# Patient Record
Sex: FEMALE | Race: WHITE | NOT HISPANIC OR LATINO | Employment: UNEMPLOYED | URBAN - METROPOLITAN AREA
[De-identification: names, ages, dates, MRNs, and addresses within clinical notes are randomized per-mention and may not be internally consistent; named-entity substitution may affect disease eponyms.]

---

## 2019-04-16 ENCOUNTER — HISTORICAL (OUTPATIENT)
Dept: RADIOLOGY | Facility: HOSPITAL | Age: 49
End: 2019-04-16

## 2020-05-27 ENCOUNTER — HISTORICAL (OUTPATIENT)
Dept: ADMINISTRATIVE | Facility: HOSPITAL | Age: 50
End: 2020-05-27

## 2021-08-25 ENCOUNTER — HISTORICAL (OUTPATIENT)
Dept: ADMINISTRATIVE | Facility: HOSPITAL | Age: 51
End: 2021-08-25

## 2021-10-26 ENCOUNTER — HISTORICAL (OUTPATIENT)
Dept: RADIOLOGY | Facility: HOSPITAL | Age: 51
End: 2021-10-26

## 2021-10-26 LAB
ABS NEUT (OLG): 2.75
ALBUMIN SERPL-MCNC: 3.6 GM/DL (ref 3.5–5)
ALBUMIN/GLOB SERPL: 1 RATIO (ref 1.1–2)
ALP SERPL-CCNC: 68 UNIT/L (ref 40–150)
ALT SERPL-CCNC: 13 UNIT/L (ref 0–55)
AST SERPL-CCNC: 14 UNIT/L (ref 5–34)
BASOPHILS # BLD AUTO: 0.03 X10(3)/MCL
BASOPHILS NFR BLD AUTO: 0.5 %
BILIRUB SERPL-MCNC: 0.5 MG/DL
BILIRUBIN DIRECT+TOT PNL SERPL-MCNC: 0.2 MG/DL (ref 0–0.5)
BILIRUBIN DIRECT+TOT PNL SERPL-MCNC: 0.3 MG/DL
BUN SERPL-MCNC: 11 MG/DL (ref 9.8–20.1)
CALCIUM SERPL-MCNC: 9.7 MG/DL (ref 8.7–10.5)
CHLORIDE SERPL-SCNC: 101 MMOL/L (ref 98–107)
CHOLEST SERPL-MCNC: 225 MG/DL
CHOLEST/HDLC SERPL: 6 {RATIO} (ref 0–5)
CO2 SERPL-SCNC: 30 MEQ/L (ref 22–29)
CREAT SERPL-MCNC: 0.69 MG/DL (ref 0.55–1.02)
DEPRECATED CALCIDIOL+CALCIFEROL SERPL-MC: 38.9 NG/ML (ref 30–80)
EOSINOPHIL # BLD AUTO: 0.38 X10(3)/MCL
EOSINOPHIL NFR BLD AUTO: 6.6 %
ERYTHROCYTE [DISTWIDTH] IN BLOOD BY AUTOMATED COUNT: 15 %
EST. AVERAGE GLUCOSE BLD GHB EST-MCNC: 103 MG/DL
GLOBULIN SER-MCNC: 3.7 GM/DL (ref 2.4–3.5)
GLUCOSE SERPL-MCNC: 97 MG/DL (ref 74–100)
HBA1C MFR BLD: 5.2 % (ref 4–6)
HCT VFR BLD AUTO: 38.3 % (ref 34–46)
HDLC SERPL-MCNC: 35 MG/DL (ref 35–60)
HGB BLD-MCNC: 12.1 GM/DL (ref 11.3–15.4)
IMM GRANULOCYTES # BLD AUTO: 0.01 10*3/UL (ref 0–0.1)
IMM GRANULOCYTES NFR BLD AUTO: 0.2 % (ref 0–1)
LDLC SERPL CALC-MCNC: 150 MG/DL (ref 50–140)
LYMPHOCYTES # BLD AUTO: 2.22 X10(3)/MCL
LYMPHOCYTES NFR BLD AUTO: 38.5 %
MCH RBC QN AUTO: 26.5 PG (ref 27–33)
MCHC RBC AUTO-ENTMCNC: 31.6 GM/DL (ref 32–35)
MCV RBC AUTO: 84 FL (ref 81–97)
MONOCYTES # BLD AUTO: 0.37 X10(3)/MCL
MONOCYTES NFR BLD AUTO: 6.4 %
NEUTROPHILS # BLD AUTO: 2.75 X10(3)/MCL
NEUTROPHILS NFR BLD AUTO: 47.8 %
PLATELET # BLD AUTO: 331 X10(3)/MCL (ref 140–450)
PMV BLD AUTO: 9 FL
POTASSIUM SERPL-SCNC: 4.2 MMOL/L (ref 3.5–5.1)
PROT SERPL-MCNC: 7.3 GM/DL (ref 6.4–8.3)
RBC # BLD AUTO: 4.56 X10(6)/MCL (ref 3.9–5)
SODIUM SERPL-SCNC: 139 MMOL/L (ref 136–145)
TRIGL SERPL-MCNC: 201 MG/DL (ref 37–140)
TSH SERPL-ACNC: 2.76 UIU/ML (ref 0.35–4.94)
VLDLC SERPL CALC-MCNC: 40 MG/DL
WBC # SPEC AUTO: 5.76 X10(3)/MCL (ref 3.4–9.2)

## 2022-04-08 ENCOUNTER — HISTORICAL (OUTPATIENT)
Dept: ADMINISTRATIVE | Facility: HOSPITAL | Age: 52
End: 2022-04-08

## 2022-08-11 ENCOUNTER — HOSPITAL ENCOUNTER (EMERGENCY)
Facility: HOSPITAL | Age: 52
Discharge: HOME | End: 2022-08-11
Attending: GENERAL PRACTICE
Payer: MEDICAID

## 2022-08-11 VITALS
BODY MASS INDEX: 53.92 KG/M2 | SYSTOLIC BLOOD PRESSURE: 132 MMHG | HEART RATE: 69 BPM | DIASTOLIC BLOOD PRESSURE: 88 MMHG | TEMPERATURE: 99 F | RESPIRATION RATE: 22 BRPM | HEIGHT: 62 IN | WEIGHT: 293 LBS | OXYGEN SATURATION: 100 %

## 2022-08-11 DIAGNOSIS — J20.9 ACUTE BRONCHITIS, UNSPECIFIED ORGANISM: Primary | ICD-10-CM

## 2022-08-11 LAB
FLUAV AG UPPER RESP QL IA.RAPID: NOT DETECTED
FLUBV AG UPPER RESP QL IA.RAPID: NOT DETECTED
SARS-COV-2 RNA RESP QL NAA+PROBE: NOT DETECTED
STREP A PCR (OHS): NOT DETECTED

## 2022-08-11 PROCEDURE — 87631 RESP VIRUS 3-5 TARGETS: CPT | Performed by: GENERAL PRACTICE

## 2022-08-11 PROCEDURE — 99900031 HC PATIENT EDUCATION (STAT)

## 2022-08-11 PROCEDURE — 99284 EMERGENCY DEPT VISIT MOD MDM: CPT | Mod: 25

## 2022-08-11 PROCEDURE — 99900035 HC TECH TIME PER 15 MIN (STAT)

## 2022-08-11 PROCEDURE — 99285 EMERGENCY DEPT VISIT HI MDM: CPT

## 2022-08-11 PROCEDURE — 87636 SARSCOV2 & INF A&B AMP PRB: CPT | Mod: 59 | Performed by: GENERAL PRACTICE

## 2022-08-11 PROCEDURE — 25000242 PHARM REV CODE 250 ALT 637 W/ HCPCS: Performed by: GENERAL PRACTICE

## 2022-08-11 PROCEDURE — 94640 AIRWAY INHALATION TREATMENT: CPT

## 2022-08-11 PROCEDURE — 94761 N-INVAS EAR/PLS OXIMETRY MLT: CPT

## 2022-08-11 RX ORDER — IPRATROPIUM BROMIDE AND ALBUTEROL SULFATE 2.5; .5 MG/3ML; MG/3ML
3 SOLUTION RESPIRATORY (INHALATION)
Status: COMPLETED | OUTPATIENT
Start: 2022-08-11 | End: 2022-08-11

## 2022-08-11 RX ORDER — IPRATROPIUM BROMIDE AND ALBUTEROL SULFATE 2.5; .5 MG/3ML; MG/3ML
3 SOLUTION RESPIRATORY (INHALATION)
Status: DISPENSED | OUTPATIENT
Start: 2022-08-11 | End: 2022-08-11

## 2022-08-11 RX ORDER — ALBUTEROL SULFATE 90 UG/1
2 AEROSOL, METERED RESPIRATORY (INHALATION) EVERY 6 HOURS PRN
Qty: 18 G | Refills: 0 | Status: SHIPPED | OUTPATIENT
Start: 2022-08-11 | End: 2023-08-11

## 2022-08-11 RX ORDER — KETOROLAC TROMETHAMINE 30 MG/ML
60 INJECTION, SOLUTION INTRAMUSCULAR; INTRAVENOUS
Status: DISCONTINUED | OUTPATIENT
Start: 2022-08-11 | End: 2022-08-12 | Stop reason: HOSPADM

## 2022-08-11 RX ORDER — DEXAMETHASONE SODIUM PHOSPHATE 4 MG/ML
4 INJECTION, SOLUTION INTRA-ARTICULAR; INTRALESIONAL; INTRAMUSCULAR; INTRAVENOUS; SOFT TISSUE
Status: DISCONTINUED | OUTPATIENT
Start: 2022-08-11 | End: 2022-08-12 | Stop reason: HOSPADM

## 2022-08-11 RX ADMIN — IPRATROPIUM BROMIDE AND ALBUTEROL SULFATE 3 ML: 2.5; .5 SOLUTION RESPIRATORY (INHALATION) at 09:08

## 2022-08-12 NOTE — ED PROVIDER NOTES
Encounter Date: 8/11/2022       History     Chief Complaint   Patient presents with    Cough     Cough for 2 days, boil on right buttocks today     Cough for 2 days, boil on right buttocks today      Cough  This is a new problem. The current episode started several days ago. The problem occurs constantly. The problem has been unchanged. The cough is non-productive. There has been no fever. Associated symptoms include rhinorrhea, sore throat, shortness of breath and wheezing. She is not a smoker.     Review of patient's allergies indicates:  Not on File  No past medical history on file.  No past surgical history on file.  No family history on file.     Review of Systems   Constitutional: Negative.    HENT: Positive for rhinorrhea and sore throat.    Eyes: Negative.    Respiratory: Positive for cough, shortness of breath and wheezing.    Cardiovascular: Negative.    Gastrointestinal: Negative.    Endocrine: Negative.    Genitourinary: Negative.    Musculoskeletal: Negative.    Skin: Negative.    Allergic/Immunologic: Negative.    Neurological: Negative.    Hematological: Negative.    Psychiatric/Behavioral: Negative.    All other systems reviewed and are negative.      Physical Exam     Initial Vitals [08/11/22 1957]   BP Pulse Resp Temp SpO2   (!) 150/112 83 (!) 22 98.6 °F (37 °C) 100 %      MAP       --         Physical Exam    Nursing note and vitals reviewed.  Constitutional: She appears well-developed and well-nourished.   HENT:   Head: Normocephalic and atraumatic.   Eyes: EOM are normal. Pupils are equal, round, and reactive to light.   Neck: Neck supple.   Normal range of motion.  Cardiovascular: Normal rate, regular rhythm, normal heart sounds and intact distal pulses.   Pulmonary/Chest: No respiratory distress. She has wheezes. She has no rhonchi. She has no rales. She exhibits no tenderness.   Abdominal: Abdomen is soft. Bowel sounds are normal.   Musculoskeletal:         General: Normal range of motion.       Cervical back: Normal range of motion and neck supple.     Neurological: She is alert and oriented to person, place, and time. She has normal strength. GCS score is 15. GCS eye subscore is 4. GCS verbal subscore is 5. GCS motor subscore is 6.   Skin: Skin is warm and dry.   Psychiatric: She has a normal mood and affect. Her behavior is normal. Judgment and thought content normal.         ED Course   Procedures  Labs Reviewed   STREP GROUP A BY PCR - Normal   COVID/FLU A&B PCR - Normal          Imaging Results    None          Medications   albuterol-ipratropium 2.5 mg-0.5 mg/3 mL nebulizer solution 3 mL ( Nebulization Canceled Entry 8/11/22 2110)   albuterol-ipratropium 2.5 mg-0.5 mg/3 mL nebulizer solution 3 mL (has no administration in time range)   dexamethasone injection 4 mg (has no administration in time range)     Medical Decision Making:   Clinical Tests:   Lab Tests: Ordered and Reviewed  Patient is better but still has some expiratory wheezing.  She has been exposed to cigarette smoke at work.  This is likely the cause of her bronchitis.  Her lungs are otherwise clear.                      Clinical Impression:   Final diagnoses:  [J20.9] Acute bronchitis, unspecified organism (Primary)          ED Disposition Condition    Discharge Stable        ED Prescriptions     Medication Sig Dispense Start Date End Date Auth. Provider    albuterol (VENTOLIN HFA) 90 mcg/actuation inhaler Inhale 2 puffs into the lungs every 6 (six) hours as needed for Wheezing. Rescue 18 g 8/11/2022 8/11/2023 Rl Mejia MD        Follow-up Information     Follow up With Specialties Details Why Contact Info    Claude H. Meeks, MD Family Medicine In 5 days If symptoms worsen [address] [place] [address] DR Yisel [address]ERAS [id number]  [phone]             Rl Mejia MD  08/11/22 2670

## 2023-06-13 ENCOUNTER — HOSPITAL ENCOUNTER (EMERGENCY)
Facility: HOSPITAL | Age: 53
Discharge: HOME | End: 2023-06-13
Attending: FAMILY MEDICINE
Payer: MEDICAID

## 2023-06-13 VITALS
TEMPERATURE: 97 F | WEIGHT: 293 LBS | OXYGEN SATURATION: 99 % | DIASTOLIC BLOOD PRESSURE: 94 MMHG | RESPIRATION RATE: 18 BRPM | HEIGHT: 62 IN | SYSTOLIC BLOOD PRESSURE: 150 MMHG | HEART RATE: 70 BPM | BODY MASS INDEX: 53.92 KG/M2

## 2023-06-13 DIAGNOSIS — R60.9 SWELLING: Primary | ICD-10-CM

## 2023-06-13 DIAGNOSIS — Z91.038 ALLERGIC REACTION TO INSECT BITE: ICD-10-CM

## 2023-06-13 PROCEDURE — 63600175 PHARM REV CODE 636 W HCPCS: Performed by: FAMILY MEDICINE

## 2023-06-13 PROCEDURE — 96372 THER/PROPH/DIAG INJ SC/IM: CPT | Performed by: FAMILY MEDICINE

## 2023-06-13 PROCEDURE — 99284 EMERGENCY DEPT VISIT MOD MDM: CPT

## 2023-06-13 RX ORDER — CETIRIZINE HYDROCHLORIDE 10 MG/1
10 TABLET ORAL
COMMUNITY
Start: 2023-03-17

## 2023-06-13 RX ORDER — RIVAROXABAN 10 MG/1
10 TABLET, FILM COATED ORAL
COMMUNITY
Start: 2023-05-16

## 2023-06-13 RX ORDER — METHYLPREDNISOLONE SOD SUCC 125 MG
125 VIAL (EA) INJECTION
Status: COMPLETED | OUTPATIENT
Start: 2023-06-13 | End: 2023-06-13

## 2023-06-13 RX ORDER — AMLODIPINE BESYLATE 10 MG/1
10 TABLET ORAL
COMMUNITY
Start: 2023-05-16

## 2023-06-13 RX ORDER — GABAPENTIN 300 MG/1
300 CAPSULE ORAL 2 TIMES DAILY
COMMUNITY
Start: 2023-01-05

## 2023-06-13 RX ORDER — TRAZODONE HYDROCHLORIDE 100 MG/1
100 TABLET ORAL NIGHTLY
COMMUNITY
Start: 2023-06-07

## 2023-06-13 RX ORDER — GEMFIBROZIL 600 MG/1
600 TABLET, FILM COATED ORAL 2 TIMES DAILY
COMMUNITY
Start: 2023-05-16

## 2023-06-13 RX ORDER — BENZONATATE 200 MG/1
200 CAPSULE ORAL 2 TIMES DAILY PRN
COMMUNITY
Start: 2023-03-17

## 2023-06-13 RX ORDER — BUMETANIDE 1 MG/1
1 TABLET ORAL DAILY PRN
COMMUNITY
Start: 2023-05-16

## 2023-06-13 RX ORDER — METHYLPREDNISOLONE 4 MG/1
TABLET ORAL
Qty: 1 EACH | Refills: 0 | Status: SHIPPED | OUTPATIENT
Start: 2023-06-13 | End: 2023-07-04

## 2023-06-13 RX ORDER — ATORVASTATIN CALCIUM 20 MG/1
20 TABLET, FILM COATED ORAL
COMMUNITY
Start: 2023-06-03

## 2023-06-13 RX ORDER — MECLIZINE HCL 25MG 25 MG/1
25 TABLET, CHEWABLE ORAL
COMMUNITY

## 2023-06-13 RX ADMIN — METHYLPREDNISOLONE SODIUM SUCCINATE 125 MG: 125 INJECTION, POWDER, FOR SOLUTION INTRAMUSCULAR; INTRAVENOUS at 01:06

## 2023-06-13 NOTE — ED NOTES
Pt ambulated to room 4 with steady gait. Pt states she woke up with a swollen neck and has three insect bites to the upper back. Pt rates pain 7/10. Pt denies needs at this time. Family at the bedside.

## 2023-06-13 NOTE — ED PROVIDER NOTES
Encounter Date: 6/13/2023       History     Chief Complaint   Patient presents with    Neck Pain     Bilateral neck swelling and pain, onset this morning. Also has insect bites to upper back.      This patient is a 52-year-old female who comes in with bilateral neck swelling and pain that started this morning.  Patient has several insect bites on her upper back.  Patient states that the insect bites are very itchy as well as the swelling    The history is provided by the patient.   Review of patient's allergies indicates:   Allergen Reactions    Contrast media Nausea And Vomiting and Shortness Of Breath    Chlorpheniram-dm-acetaminophen Hives    Clonidine Hives    Dayquil sinus pressure/pain     Doxylamin-pse-dm-acetaminophen     Dye     Pseudoephed-dm-acetaminophen Hives    Tramadol Hives    Penicillins Nausea Only     No past medical history on file.  No past surgical history on file.  No family history on file.     Review of Systems   Constitutional: Negative.    HENT: Negative.     Respiratory: Negative.     Cardiovascular: Negative.    Endocrine: Negative.    Neurological: Negative.    Psychiatric/Behavioral: Negative.     All other systems reviewed and are negative.    Physical Exam     Initial Vitals [06/13/23 1247]   BP Pulse Resp Temp SpO2   (!) 161/97 64 20 97.2 °F (36.2 °C) 98 %      MAP       --         Physical Exam    Nursing note and vitals reviewed.  Constitutional: She appears well-developed.   HENT:   Head: Normocephalic.   Eyes: Pupils are equal, round, and reactive to light.   Neck:   There is no lymphadenopathy in the neck.  I do not appreciate the neck swelling but the patient says she feels like her neck is swollen.  She denies any throat swelling or difficulty swallowing or shortness of breath.   Cardiovascular:  Normal rate, regular rhythm and normal heart sounds.           Pulmonary/Chest: Breath sounds normal.   Abdominal: Abdomen is soft.   Musculoskeletal:      Cervical back: Decreased  range of motion.     Neurological: She is alert and oriented to person, place, and time. GCS score is 15. GCS eye subscore is 4. GCS verbal subscore is 5. GCS motor subscore is 6.   Skin: Skin is warm and dry. Capillary refill takes less than 2 seconds.        4-5 insect bites on the right upper back.   Psychiatric: She has a normal mood and affect.       ED Course   Procedures  Labs Reviewed - No data to display       Imaging Results    None          Medications   methylPREDNISolone sodium succinate injection 125 mg (125 mg Intramuscular Given 6/13/23 1314)     Medical Decision Making:   Initial Assessment:   This patient is a 52-year-old female with multiple insect bites on her right upper back.  Patient states that she woke up with bilateral neck swelling.  She also states that her arms are slightly swollen too.  This is not appreciated on exam  Differential Diagnosis:   Allergic reaction to insect bite, lymphadenopathy  ED Management:  On exam there are no swollen lymph nodes.  Patient states she appreciates some slight swelling in bilateral neck and in her bilateral upper arms.  She is given a steroid shot in the emergency room because she states that there is a lot of pruritus as well.                        Clinical Impression:   Final diagnoses:  [R60.9] Swelling (Primary)  [Z91.038] Allergic reaction to insect bite        ED Disposition Condition    Discharge Stable          ED Prescriptions       Medication Sig Dispense Start Date End Date Auth. Provider    methylPREDNISolone (MEDROL DOSEPACK) 4 mg tablet Take as directed 1 each 6/13/2023 7/4/2023 Vincent Jimenez MD          Follow-up Information       Follow up With Specialties Details Why Contact Info    Claude H. Meeks, MD Family Medicine Schedule an appointment as soon as possible for a visit in 2 days  [address] [place] [address] [address] Yisel [address]ERAS [id number]  [phone]               Vincent Jimenez MD  06/13/23 4836

## 2023-11-05 ENCOUNTER — HOSPITAL ENCOUNTER (EMERGENCY)
Facility: HOSPITAL | Age: 53
Discharge: HOME | End: 2023-11-05
Attending: GENERAL PRACTICE
Payer: MEDICAID

## 2023-11-05 VITALS
RESPIRATION RATE: 20 BRPM | TEMPERATURE: 99 F | WEIGHT: 293 LBS | OXYGEN SATURATION: 99 % | HEIGHT: 62 IN | HEART RATE: 72 BPM | SYSTOLIC BLOOD PRESSURE: 121 MMHG | BODY MASS INDEX: 53.92 KG/M2 | DIASTOLIC BLOOD PRESSURE: 67 MMHG

## 2023-11-05 DIAGNOSIS — L73.9 FOLLICULITIS: Primary | ICD-10-CM

## 2023-11-05 PROCEDURE — 99284 EMERGENCY DEPT VISIT MOD MDM: CPT

## 2023-11-05 PROCEDURE — 25000003 PHARM REV CODE 250: Performed by: GENERAL PRACTICE

## 2023-11-05 RX ORDER — SULFAMETHOXAZOLE AND TRIMETHOPRIM 800; 160 MG/1; MG/1
1 TABLET ORAL
Status: COMPLETED | OUTPATIENT
Start: 2023-11-05 | End: 2023-11-05

## 2023-11-05 RX ORDER — NAPROXEN 375 MG/1
375 TABLET ORAL 2 TIMES DAILY WITH MEALS
Qty: 60 TABLET | Refills: 0 | Status: SHIPPED | OUTPATIENT
Start: 2023-11-05 | End: 2023-12-30 | Stop reason: CLARIF

## 2023-11-05 RX ORDER — KETOROLAC TROMETHAMINE 10 MG/1
10 TABLET, FILM COATED ORAL
Status: COMPLETED | OUTPATIENT
Start: 2023-11-05 | End: 2023-11-05

## 2023-11-05 RX ORDER — SULFAMETHOXAZOLE AND TRIMETHOPRIM 800; 160 MG/1; MG/1
1 TABLET ORAL 2 TIMES DAILY
Qty: 14 TABLET | Refills: 0 | Status: SHIPPED | OUTPATIENT
Start: 2023-11-05 | End: 2023-11-12

## 2023-11-05 RX ADMIN — SULFAMETHOXAZOLE AND TRIMETHOPRIM 1 TABLET: 800; 160 TABLET ORAL at 01:11

## 2023-11-05 RX ADMIN — KETOROLAC TROMETHAMINE 10 MG: 10 TABLET, FILM COATED ORAL at 01:11

## 2023-11-05 NOTE — ED PROVIDER NOTES
Encounter Date: 11/5/2023       History     Chief Complaint   Patient presents with    Abscess     Right upper inner arm abscess x 2 days.       Right upper inner arm abscess x 2 days.      The history is provided by the patient.   Abscess   This is a new problem. The current episode started two days ago. The problem has been gradually worsening. The abscess is present on the right arm. The pain is at a severity of 5/10. The abscess is characterized by itchiness, redness and painfulness. She has received no recent medical care.     Review of patient's allergies indicates:   Allergen Reactions    Contrast media Nausea And Vomiting and Shortness Of Breath    Chlorpheniram-dm-acetaminophen Hives    Clonidine Hives    Dayquil sinus pressure/pain     Doxylamin-pse-dm-acetaminophen     Dye     Pseudoephed-dm-acetaminophen Hives    Tramadol Hives    Penicillins Nausea Only     No past medical history on file.  No past surgical history on file.  No family history on file.     Review of Systems   Constitutional: Negative.    HENT: Negative.     Eyes: Negative.    Respiratory: Negative.     Cardiovascular: Negative.    Gastrointestinal: Negative.    Endocrine: Negative.    Genitourinary: Negative.    Musculoskeletal: Negative.    Skin:  Positive for wound.   Allergic/Immunologic: Negative.    Neurological: Negative.    Hematological: Negative.    Psychiatric/Behavioral: Negative.     All other systems reviewed and are negative.      Physical Exam     Initial Vitals [11/05/23 1304]   BP Pulse Resp Temp SpO2   117/80 98 20 98.7 °F (37.1 °C) 99 %      MAP       --         Physical Exam    Nursing note and vitals reviewed.  Constitutional: She appears well-developed and well-nourished.   HENT:   Head: Normocephalic and atraumatic.   Eyes: EOM are normal. Pupils are equal, round, and reactive to light.   Neck: Neck supple.   Normal range of motion.  Cardiovascular:  Normal rate, regular rhythm, normal heart sounds and intact  distal pulses.           Pulmonary/Chest: Breath sounds normal.   Abdominal: Abdomen is soft. Bowel sounds are normal.   Musculoskeletal:         General: Normal range of motion.      Cervical back: Normal range of motion and neck supple.     Neurological: She is alert and oriented to person, place, and time. She has normal strength. GCS score is 15. GCS eye subscore is 4. GCS verbal subscore is 5. GCS motor subscore is 6.   Skin: Skin is warm and dry.        Punctate appearing area of folliculitis with minimal erythema.  There is no induration and no evidence of any acute cellulitis.     Psychiatric: She has a normal mood and affect. Her behavior is normal. Judgment and thought content normal.         ED Course   Procedures  Labs Reviewed - No data to display       Imaging Results    None          Medications   ketorolac tablet 10 mg (10 mg Oral Given 11/5/23 1330)   sulfamethoxazole-trimethoprim 800-160mg per tablet 1 tablet (1 tablet Oral Given 11/5/23 1330)     Medical Decision Making  If anything, this is a mild folliculitis.  However, it is causing the patient some distress so I have advised the patient to treat this with warm compresses.  Additionally, we will be giving the patient a prescription for some antibiotics as she has a history of atopic in the past that often gets worse without treatment.  We will also wrap the area so that it is not rubbing against her chest wall and causing greater pain.  There is no indication for any aggressive management and there is absolutely no indication for any sort of incision and drainage at this time as there is nothing to drain.                                 Clinical Impression:   Final diagnoses:  [L73.9] Folliculitis (Primary)        ED Disposition Condition    Discharge Stable          ED Prescriptions       Medication Sig Dispense Start Date End Date Auth. Provider    naproxen (NAPROSYN) 375 MG tablet Take 1 tablet (375 mg total) by mouth 2 (two) times daily  with meals. 60 tablet 11/5/2023 -- Rl Mejia MD    sulfamethoxazole-trimethoprim 800-160mg (BACTRIM DS) 800-160 mg Tab Take 1 tablet by mouth 2 (two) times daily. for 7 days 14 tablet 11/5/2023 11/12/2023 Rl Mejia MD          Follow-up Information       Follow up With Specialties Details Why Contact Info    Meeks, Claude H., MD Family Medicine In 2 days For wound re-check [address] [place] [address] DR Yisel [address]ERAS [id number]  [phone]               Rl Mejia MD  11/05/23 1215

## 2023-11-05 NOTE — ED NOTES
Pt ambulated to room 2 with steady gait. C/o abscess in upper right arm by axilla. Rates pain 7/10. States that she has a history of boils on multiple parts of her body as well as lipomas. Denies any needs at this time. Call light in reach.

## 2023-12-15 ENCOUNTER — HOSPITAL ENCOUNTER (EMERGENCY)
Facility: HOSPITAL | Age: 53
Discharge: HOME | End: 2023-12-15
Attending: FAMILY MEDICINE
Payer: MEDICAID

## 2023-12-15 VITALS
SYSTOLIC BLOOD PRESSURE: 145 MMHG | HEIGHT: 62 IN | DIASTOLIC BLOOD PRESSURE: 94 MMHG | TEMPERATURE: 96 F | OXYGEN SATURATION: 100 % | RESPIRATION RATE: 20 BRPM | BODY MASS INDEX: 53.92 KG/M2 | HEART RATE: 78 BPM | WEIGHT: 293 LBS

## 2023-12-15 DIAGNOSIS — J10.1 INFLUENZA A: Primary | ICD-10-CM

## 2023-12-15 LAB
FLUAV AG UPPER RESP QL IA.RAPID: DETECTED
FLUBV AG UPPER RESP QL IA.RAPID: NOT DETECTED
RSV A 5' UTR RNA NPH QL NAA+PROBE: NOT DETECTED
SARS-COV-2 RNA RESP QL NAA+PROBE: NOT DETECTED
STREP A PCR (OHS): NOT DETECTED

## 2023-12-15 PROCEDURE — 0241U COVID/RSV/FLU A&B PCR: CPT | Performed by: FAMILY MEDICINE

## 2023-12-15 PROCEDURE — 87651 STREP A DNA AMP PROBE: CPT | Performed by: FAMILY MEDICINE

## 2023-12-15 PROCEDURE — 99284 EMERGENCY DEPT VISIT MOD MDM: CPT

## 2023-12-15 RX ORDER — OSELTAMIVIR PHOSPHATE 75 MG/1
75 CAPSULE ORAL 2 TIMES DAILY
Qty: 10 CAPSULE | Refills: 0 | Status: SHIPPED | OUTPATIENT
Start: 2023-12-15 | End: 2023-12-20

## 2023-12-15 RX ORDER — PROMETHAZINE HYDROCHLORIDE AND DEXTROMETHORPHAN HYDROBROMIDE 6.25; 15 MG/5ML; MG/5ML
7.5 SYRUP ORAL EVERY 6 HOURS PRN
Qty: 150 ML | Refills: 0 | Status: SHIPPED | OUTPATIENT
Start: 2023-12-15 | End: 2023-12-25

## 2023-12-15 NOTE — ED PROVIDER NOTES
"Encounter Date: 12/15/2023       History     Chief Complaint   Patient presents with    COVID-19 Concerns     Covid and strep exposure.  Headache x 2 days, cough congestion.       This is a 53 year old who presents "lung pain", dry cough, headache, stuffy nose, fever and chills for 2 days.  She was exposed to COVID and strep.  She has a past medical history of hypertension    The history is provided by the patient.     Review of patient's allergies indicates:   Allergen Reactions    Contrast media Nausea And Vomiting and Shortness Of Breath    Chlorpheniram-dm-acetaminophen Hives    Clonidine Hives    Dayquil sinus pressure/pain     Doxylamin-pse-dm-acetaminophen     Dye     Pseudoephed-dm-acetaminophen Hives    Tramadol Hives    Penicillins Nausea Only     No past medical history on file.  No past surgical history on file.  No family history on file.     Review of Systems   Constitutional:  Negative for fever.   HENT:  Positive for congestion and rhinorrhea.    Eyes:  Negative for visual disturbance.   Respiratory:  Positive for cough. Negative for shortness of breath.    Cardiovascular:  Negative for chest pain.   Gastrointestinal:  Negative for abdominal pain.   Musculoskeletal:  Positive for back pain and myalgias. Negative for neck pain.   Skin:  Negative for rash.   Neurological:  Positive for headaches.   Psychiatric/Behavioral:  Negative for behavioral problems.    All other systems reviewed and are negative.      Physical Exam     Initial Vitals [12/15/23 1342]   BP Pulse Resp Temp SpO2   (!) 145/94 89 20 96.1 °F (35.6 °C) (!) 94 %      MAP       --         Physical Exam    Nursing note and vitals reviewed.  Constitutional: She appears well-developed and well-nourished. She appears distressed.   HENT:   Head: Normocephalic and atraumatic.   Eyes: Pupils are equal, round, and reactive to light.   Neck: Neck supple.   Normal range of motion.  Cardiovascular:  Normal rate, regular rhythm and normal heart " sounds.           Pulmonary/Chest: Breath sounds normal.   Abdominal: Abdomen is soft. Bowel sounds are normal.   Musculoskeletal:         General: Normal range of motion.      Cervical back: Normal range of motion and neck supple.     Neurological: She is alert and oriented to person, place, and time.         ED Course   Procedures  Labs Reviewed   COVID/RSV/FLU A&B PCR - Abnormal; Notable for the following components:       Result Value    Influenza A PCR Detected (*)     All other components within normal limits    Narrative:     The Xpert Xpress SARS-CoV-2/FLU/RSV plus is a rapid, multiplexed real-time PCR test intended for the simultaneous qualitative detection and differentiation of SARS-CoV-2, Influenza A, Influenza B, and respiratory syncytial virus (RSV) viral RNA in either nasopharyngeal swab or nasal swab specimens.         STREP GROUP A BY PCR - Normal    Narrative:     The Xpert Xpress Strep A test is a rapid, qualitative in vitro diagnostic test for the detection of Streptococcus pyogenes (Group A ß-hemolytic Streptococcus, Strep A) in throat swab specimens from patients with signs and symptoms of pharyngitis.            Imaging Results    None          Medications - No data to display  Medical Decision Making  Amount and/or Complexity of Data Reviewed  Labs: ordered.    Risk  Prescription drug management.                                      Clinical Impression:  Final diagnoses:  [J10.1] Influenza A (Primary)          ED Disposition Condition    Discharge Stable          ED Prescriptions       Medication Sig Dispense Start Date End Date Auth. Provider    oseltamivir (TAMIFLU) 75 MG capsule Take 1 capsule (75 mg total) by mouth 2 (two) times daily. for 5 days 10 capsule 12/15/2023 12/20/2023 Ben Barnhart MD    promethazine-dextromethorphan (PROMETHAZINE-DM) 6.25-15 mg/5 mL Syrp Take 7.5 mLs by mouth every 6 (six) hours as needed. 150 mL 12/15/2023 12/25/2023 Ben Barnhart MD           Follow-up Information       Follow up With Specialties Details Why Contact Info    Meeks, Claude H., MD Family Medicine In 3 days  [facility] [address] Yisel [address]ERAS [address]  [phone]               Ben Barnhart MD  12/15/23 7234

## 2023-12-15 NOTE — ED NOTES
Pt ambulated to ed rm 5 from [facility]. Aaox4. Pt reports cough, chills, headache, subjective fever for 2 days. Pt states she has been around someone with covid and strep. Pt in no distress. Stephanetm

## 2023-12-30 ENCOUNTER — HOSPITAL ENCOUNTER (EMERGENCY)
Facility: HOSPITAL | Age: 53
Discharge: HOME | End: 2023-12-30
Attending: STUDENT IN AN ORGANIZED HEALTH CARE EDUCATION/TRAINING PROGRAM
Payer: MEDICAID

## 2023-12-30 VITALS
OXYGEN SATURATION: 97 % | SYSTOLIC BLOOD PRESSURE: 140 MMHG | HEART RATE: 66 BPM | HEIGHT: 62 IN | BODY MASS INDEX: 53.92 KG/M2 | RESPIRATION RATE: 20 BRPM | DIASTOLIC BLOOD PRESSURE: 85 MMHG | WEIGHT: 293 LBS | TEMPERATURE: 96 F

## 2023-12-30 DIAGNOSIS — L02.91 ABSCESS: Primary | ICD-10-CM

## 2023-12-30 PROCEDURE — 99283 EMERGENCY DEPT VISIT LOW MDM: CPT

## 2023-12-30 RX ORDER — SULFAMETHOXAZOLE AND TRIMETHOPRIM 800; 160 MG/1; MG/1
1 TABLET ORAL 2 TIMES DAILY
Qty: 14 TABLET | Refills: 0 | Status: SHIPPED | OUTPATIENT
Start: 2023-12-30 | End: 2024-01-06

## 2023-12-30 NOTE — ED NOTES
Pt c/o boil in right groin since yesterday. Pt states walking increases the pain and its hot to touch. Denies any discharge.

## 2023-12-30 NOTE — ED PROVIDER NOTES
Encounter Date: 12/30/2023       History     Chief Complaint   Patient presents with    Abscess     Abscess to right upper inner thigh x 3 days.       54yo WF past medical history hypertension presents for boil to inner right groin. Patient states noticed 3 days ago, progressively getting more painful and swollen. History of boils in past but not in some time. She denies fever, chills, n/v/d.       Review of patient's allergies indicates:   Allergen Reactions    Contrast media Nausea And Vomiting and Shortness Of Breath    Chlorpheniram-dm-acetaminophen Hives    Clonidine Hives    Dayquil sinus pressure/pain     Doxylamin-pse-dm-acetaminophen     Dye     Pseudoephed-dm-acetaminophen Hives    Tramadol Hives    Penicillins Nausea Only     Past Medical History:   Diagnosis Date    Blood clot in vein     Hypertension      History reviewed. No pertinent surgical history.  History reviewed. No pertinent family history.  Social History     Tobacco Use    Smoking status: Never    Smokeless tobacco: Never   Substance Use Topics    Alcohol use: Never    Drug use: Never     Review of Systems   Constitutional:  Negative for fever.   HENT:  Negative for sore throat.    Respiratory:  Negative for shortness of breath.    Cardiovascular:  Negative for chest pain.   Gastrointestinal:  Negative for nausea.   Genitourinary:  Negative for dysuria.   Musculoskeletal:  Negative for back pain.   Skin:  Negative for rash.        Boil to inner right groin   Neurological:  Negative for weakness.   Hematological:  Does not bruise/bleed easily.       Physical Exam     Initial Vitals [12/30/23 1107]   BP Pulse Resp Temp SpO2   (!) 147/98 76 20 96.4 °F (35.8 °C) 98 %      MAP       --         Physical Exam    Vitals reviewed.  Constitutional: She appears well-developed and well-nourished. She is not diaphoretic. She is Obese . No distress.   HENT:   Head: Normocephalic and atraumatic.   Eyes: Conjunctivae are normal.   Neck: Neck supple.    Cardiovascular:  Normal rate and regular rhythm.           Pulmonary/Chest: No respiratory distress.   Abdominal: Abdomen is soft.   Musculoskeletal:         General: Normal range of motion.      Cervical back: Neck supple.     Neurological: She is alert and oriented to person, place, and time.   Skin: Skin is warm and dry. Abscess (right inguinal crease fluctuant 4x2cm, erythema, no drainage) noted.   Psychiatric: She has a normal mood and affect. Thought content normal.         ED Course   Procedures  Labs Reviewed - No data to display       Imaging Results    None          Medications - No data to display  Medical Decision Making  54yo female presenting with boil. Differential includes abscess, infected hair, cellulitis, boil. Discussed I&D with patient, she does not want at this time and requests medications instead. Discussed that abscess may not improve without I&D she stated understanding. Will treat with bactrim DS BID for 7 days. Strict return precautions given for worsening pain, swelling, systemic symptoms.     Risk  OTC drugs.  Prescription drug management.                                      Clinical Impression:  Final diagnoses:  [L02.91] Abscess (Primary)          ED Disposition Condition    Discharge Stable          ED Prescriptions       Medication Sig Dispense Start Date End Date Auth. Provider    sulfamethoxazole-trimethoprim 800-160mg (BACTRIM DS) 800-160 mg Tab Take 1 tablet by mouth 2 (two) times daily. for 7 days 14 tablet 12/30/2023 1/6/2024 Jeannette Ghotra, DO          Follow-up Information       Follow up With Specialties Details Why Contact Info    Meeks, Claude H., MD Family Medicine Schedule an appointment as soon as possible for a visit   [address] [place] [address] [address] Yisel [address]ERAS [address]  [phone]      [facility] Emergency Medicine  If symptoms worsen, As needed [address] [place] [address] [phone]             Jeannette Ghotra,  DO  12/30/23 1134

## 2023-12-30 NOTE — DISCHARGE INSTRUCTIONS
Warm compresses, Epson salt baths  Tylenol, ibuprofen PRN for pain  Increase fluid intake.  Monitor for worsening symptoms and return for I&D if they develop.

## 2024-02-29 ENCOUNTER — HOSPITAL ENCOUNTER (EMERGENCY)
Facility: HOSPITAL | Age: 54
Discharge: HOME | End: 2024-02-29
Attending: STUDENT IN AN ORGANIZED HEALTH CARE EDUCATION/TRAINING PROGRAM
Payer: MEDICAID

## 2024-02-29 VITALS
OXYGEN SATURATION: 99 % | WEIGHT: 293 LBS | SYSTOLIC BLOOD PRESSURE: 135 MMHG | DIASTOLIC BLOOD PRESSURE: 90 MMHG | RESPIRATION RATE: 20 BRPM | TEMPERATURE: 97 F | HEIGHT: 62 IN | HEART RATE: 80 BPM | BODY MASS INDEX: 53.92 KG/M2

## 2024-02-29 DIAGNOSIS — K08.89 PAIN, DENTAL: Primary | ICD-10-CM

## 2024-02-29 PROCEDURE — 99283 EMERGENCY DEPT VISIT LOW MDM: CPT

## 2024-02-29 RX ORDER — CLINDAMYCIN HYDROCHLORIDE 150 MG/1
450 CAPSULE ORAL EVERY 8 HOURS
Qty: 63 CAPSULE | Refills: 0 | Status: SHIPPED | OUTPATIENT
Start: 2024-02-29 | End: 2024-03-07

## 2024-02-29 NOTE — ED PROVIDER NOTES
"Encounter Date: 2/29/2024       History     Chief Complaint   Patient presents with    Dental Pain     Left lower dental pain with gum pain, over several days, worse last night.      Patient is a 53-year-old white female with a history of prediabetes, history of DVT, hypertension who presented to the ER today due to dental pain.  She states she has a chronic dental pain and "the dentist as was put on a teeth but he did not. "Patient states she started with the pain 2-3 days ago in his not seen a dentist.  Patient points to her lower left mandible line as the source of the pain points to the to the has decay.  She denies any fevers, chills, cough, congestion, chest pain shortness of breath abdominal pain.      Review of patient's allergies indicates:   Allergen Reactions    Contrast media Nausea And Vomiting and Shortness Of Breath    Chlorpheniram-dm-acetaminophen Hives    Clonidine Hives    Dayquil sinus pressure/pain     Doxylamin-pse-dm-acetaminophen     Dye     Pseudoephed-dm-acetaminophen Hives    Tramadol Hives    Penicillins Nausea Only     Past Medical History:   Diagnosis Date    Blood clot in vein     Hypertension      No past surgical history on file.  No family history on file.  Social History     Tobacco Use    Smoking status: Never    Smokeless tobacco: Never   Substance Use Topics    Alcohol use: Never    Drug use: Never     Review of Systems   Constitutional:  Negative for chills, fatigue and fever.   HENT:  Positive for dental problem. Negative for congestion, sore throat and trouble swallowing.    Eyes:  Negative for pain and visual disturbance.   Respiratory:  Negative for cough, shortness of breath and wheezing.    Cardiovascular:  Negative for chest pain and palpitations.   Gastrointestinal:  Negative for abdominal pain, blood in stool, constipation, diarrhea, nausea and vomiting.   Genitourinary:  Negative for dysuria and hematuria.   Musculoskeletal:  Negative for back pain and myalgias. "   Skin:  Negative for rash and wound.   Neurological:  Negative for seizures, syncope and headaches.   Psychiatric/Behavioral:  Negative for confusion. The patient is not nervous/anxious.        Physical Exam     Initial Vitals [02/29/24 0820]   BP Pulse Resp Temp SpO2   (!) 134/95 78 18 97.2 °F (36.2 °C) 97 %      MAP       --         Physical Exam    Nursing note and vitals reviewed.  Constitutional: She appears well-developed and well-nourished. She is not diaphoretic. No distress.   HENT:   Head: Normocephalic.   Right Ear: External ear normal.   Left Ear: External ear normal.   Nose: Nose normal.   Ears: TMs clear gray positive light reflex no erythema.  Mouth:  Poor dentition throughout with no signs of erythema, induration or signs of infection on exam.  Dental decay noted to the to identified by patient.   Eyes: Conjunctivae and EOM are normal. Right eye exhibits no discharge. Left eye exhibits no discharge. No scleral icterus.   Neck:   Normal range of motion.  Cardiovascular:  Normal rate, regular rhythm and normal heart sounds.     Exam reveals no gallop and no friction rub.       No murmur heard.  Pulmonary/Chest: Breath sounds normal. No stridor. No respiratory distress. She has no wheezes. She has no rhonchi. She has no rales.   Musculoskeletal:         General: Normal range of motion.      Cervical back: Normal range of motion.     Neurological: She is alert.   Skin: Skin is warm. No rash noted. No erythema.   Psychiatric: She has a normal mood and affect. Her behavior is normal.         ED Course   Procedures  Labs Reviewed - No data to display       Imaging Results    None          Medications - No data to display  Medical Decision Making  Differentials: Dental pain, dental infection, dental caries, dental fracture, dental abscess   Historian is the patient   53-year-old well-appearing female presents with dental pain.  Poor dentition throughout.  No obvious signs of infection.  Advised patient  close follow up with a dentist is warranted.  Clindamycin started on patient as she has a penicillin allergy.  All questions answered in layman's terms and return precautions were discussed.    Risk  Prescription drug management.                                      Clinical Impression:  Final diagnoses:  [K08.89] Pain, dental (Primary)          ED Disposition Condition    Discharge Stable          ED Prescriptions       Medication Sig Dispense Start Date End Date Auth. Provider    clindamycin (CLEOCIN) 150 MG capsule Take 3 capsules (450 mg total) by mouth every 8 (eight) hours. for 7 days 63 capsule 2/29/2024 3/7/2024 Leroy Solano MD          Follow-up Information       Follow up With Specialties Details Why Contact Info    [facility] - Emergency Dept Emergency Medicine  If symptoms worsen [address] [facility] [id number] [phone]    Meeks, Claude H., MD Family Medicine Schedule an appointment as soon as possible for a visit   [address] [place] [address] DR Yisel CONTRERAS [id number]  [phone]               Leroy Solano MD  02/29/24 0887

## 2024-02-29 NOTE — ED NOTES
"Pt arrived POV. Ambulated to room 1 with steady gait. Pt complains of dental pain to lower left gums. Pt rates pain 10/10. Pt denies needs at this time. Pt denies seeing a dentist for this problem but states "I had one clindamycin left over from a while back so I took it this morning." Pt educated on proper use of antibiotics. Pt resting in bed.  "

## 2024-07-15 ENCOUNTER — HOSPITAL ENCOUNTER (EMERGENCY)
Facility: HOSPITAL | Age: 54
Discharge: HOME | End: 2024-07-15
Attending: STUDENT IN AN ORGANIZED HEALTH CARE EDUCATION/TRAINING PROGRAM
Payer: MEDICAID

## 2024-07-15 VITALS
TEMPERATURE: 98 F | SYSTOLIC BLOOD PRESSURE: 130 MMHG | OXYGEN SATURATION: 96 % | WEIGHT: 293 LBS | DIASTOLIC BLOOD PRESSURE: 80 MMHG | HEIGHT: 62 IN | BODY MASS INDEX: 53.92 KG/M2 | HEART RATE: 75 BPM | RESPIRATION RATE: 20 BRPM

## 2024-07-15 DIAGNOSIS — S76.012A STRAIN OF LEFT HIP, INITIAL ENCOUNTER: Primary | ICD-10-CM

## 2024-07-15 DIAGNOSIS — M79.89 LEG SWELLING: ICD-10-CM

## 2024-07-15 PROCEDURE — 96372 THER/PROPH/DIAG INJ SC/IM: CPT | Performed by: STUDENT IN AN ORGANIZED HEALTH CARE EDUCATION/TRAINING PROGRAM

## 2024-07-15 PROCEDURE — 99285 EMERGENCY DEPT VISIT HI MDM: CPT | Mod: 25

## 2024-07-15 PROCEDURE — 63600175 PHARM REV CODE 636 W HCPCS: Performed by: STUDENT IN AN ORGANIZED HEALTH CARE EDUCATION/TRAINING PROGRAM

## 2024-07-15 RX ORDER — KETOROLAC TROMETHAMINE 30 MG/ML
30 INJECTION, SOLUTION INTRAMUSCULAR; INTRAVENOUS
Status: COMPLETED | OUTPATIENT
Start: 2024-07-15 | End: 2024-07-15

## 2024-07-15 RX ORDER — KETOROLAC TROMETHAMINE 10 MG/1
10 TABLET, FILM COATED ORAL 3 TIMES DAILY
Qty: 15 TABLET | Refills: 0 | Status: SHIPPED | OUTPATIENT
Start: 2024-07-15 | End: 2024-07-20

## 2024-07-15 RX ADMIN — KETOROLAC TROMETHAMINE 30 MG: 30 INJECTION, SOLUTION INTRAMUSCULAR at 01:07

## 2024-07-15 NOTE — ED NOTES
Patient ambulated to ER room 5 with steady gait.  Stated that she has started walking recently and she is now having left groin pain that shoots down her left leg.  Pt has a history of blood clots and has an IVF filter.  Left Pedal Pulse +2, warm to the touch.

## 2024-07-15 NOTE — ED PROVIDER NOTES
Encounter Date: 7/15/2024       History     Chief Complaint   Patient presents with    Groin Pain     Left leg pain that shoots down her left leg and left leg felt heavy x 2 days, has just started walking for exercise.      Patient is a 53-year-old white female with a history of hypertension, hyperlipidemia, DVTs on anticoagulation with an IVC filter who presented to the ER today due to left leg pain for 2 days.  She did not take anything for relief and states it all started when a 2-year-old grandson bumped into her leg.  She states her leg feels heavy and it was in her left anterior thigh which is the source of her pain when she had a DVT 7 years ago.  Per chart review it was appears patient has had multiple DVT exam is since that time with negative ultrasound read.  Her main concern today is that it may have a recurrence of a DVT from the leg trauma sustained from the grandson.  She states she has had some left leg swelling but that is not present today and she denies any chest pain or shortness of breath.  She denies any other concerns or complaints at this time.      Review of patient's allergies indicates:   Allergen Reactions    Contrast media Nausea And Vomiting and Shortness Of Breath    Chlorpheniram-dm-acetaminophen Hives    Clonidine Hives    Dayquil sinus pressure/pain     Doxylamin-pse-dm-acetaminophen     Dye     Pseudoephed-dm-acetaminophen Hives    Tramadol Hives    Penicillins Nausea Only     Past Medical History:   Diagnosis Date    Blood clot in vein     Hypertension      History reviewed. No pertinent surgical history.  No family history on file.  Social History     Tobacco Use    Smoking status: Never    Smokeless tobacco: Never   Substance Use Topics    Alcohol use: Never    Drug use: Never     Review of Systems   Constitutional:  Negative for chills, fatigue and fever.   HENT:  Negative for congestion, sore throat and trouble swallowing.    Eyes:  Negative for pain and visual disturbance.    Respiratory:  Negative for cough, shortness of breath and wheezing.    Cardiovascular:  Negative for chest pain and palpitations.   Gastrointestinal:  Negative for abdominal pain, blood in stool, constipation, diarrhea, nausea and vomiting.   Genitourinary:  Negative for dysuria and hematuria.   Musculoskeletal:  Positive for arthralgias. Negative for back pain and myalgias.   Skin:  Negative for rash and wound.   Neurological:  Negative for seizures, syncope and headaches.   Psychiatric/Behavioral:  Negative for confusion. The patient is not nervous/anxious.        Physical Exam     Initial Vitals [07/15/24 1321]   BP Pulse Resp Temp SpO2   (!) 134/90 75 20 97.9 °F (36.6 °C) 96 %      MAP       --         Physical Exam    Nursing note and vitals reviewed.  Constitutional: She appears well-developed and well-nourished. She is not diaphoretic. No distress.   HENT:   Head: Normocephalic.   Right Ear: External ear normal.   Left Ear: External ear normal.   Nose: Nose normal.   Eyes: Conjunctivae and EOM are normal. Right eye exhibits no discharge. Left eye exhibits no discharge. No scleral icterus.   Neck:   Normal range of motion.  Cardiovascular:  Normal rate, regular rhythm and normal heart sounds.     Exam reveals no gallop and no friction rub.       No murmur heard.  Pulmonary/Chest: Breath sounds normal. No stridor. No respiratory distress. She has no wheezes. She has no rhonchi. She has no rales.   Abdominal: Abdomen is soft. She exhibits no distension. There is no abdominal tenderness. There is no rebound and no guarding.   Musculoskeletal:         General: Normal range of motion.      Cervical back: Normal range of motion.      Comments: DP pulses appreciated equal to that on the right.  Pain with passive internal and external rotation of the left hip.  No clinical signs of DVT on exam in his no edema, erythema or calf pain.  No leg-length discrepancies on exam.     Neurological: She is alert.   Skin: Skin  is warm. No rash noted. No erythema.   Psychiatric: She has a normal mood and affect. Her behavior is normal.         ED Course   Procedures  Labs Reviewed - No data to display       Imaging Results              X-Ray Hip 2 or 3 views Left with Pelvis when performed (Final result)  Result time 07/15/24 14:22:49      Final result by Yassine Jimenez MD (07/15/24 14:22:49)                   Impression:      No acute osseous abnormality.      Electronically signed by: Yassine Jimenez  Date:    07/15/2024  Time:    14:22               Narrative:    EXAMINATION:  XR HIP WITH PELVIS WHEN PERFORMED 2 OR 3 VIEWS LEFT    CLINICAL HISTORY:  pain;    COMPARISON:  None.    FINDINGS:  No acute displaced fractures or dislocations.    Joint spaces preserved.    No blastic or lytic lesions.    Soft tissues within normal limits.                                       US Lower Extremity Veins Left (Final result)  Result time 07/15/24 14:02:16      Final result by Guy Moreno MD (07/15/24 14:02:16)                   Impression:      Negative exam for left lower extremity thrombus.      Electronically signed by: Guy Moreno  Date:    07/15/2024  Time:    14:02               Narrative:    EXAMINATION:  US LOWER EXTREMITY VEINS LEFT    CLINICAL HISTORY:  Other specified soft tissue disorders    COMPARISON:  18 November 2020    FINDINGS:  Sonographic images with color and spectral analysis were obtained of the left lower extremity venous system.    The imaged left lower extremity veins demonstrate normal flow, compressibility, and augmentation without evidence of thrombus.                                       Medications   ketorolac injection 30 mg (30 mg Intramuscular Given 7/15/24 1330)     Medical Decision Making  Differential:  DVT, hip arthropathy, strain, fracture   Historian is the patient   53-year-old well-appearing female with stable vital signs presents with left hip pain after direct trauma from her 2-year-old  grandchild.  No leg-length discrepancies in physical exam findings are largely unremarkable.  Patient states that her main concern is DVT although this no clinical signs of DVT today she states that it was similar to her previous DVT symptoms.  Ultrasound showed no evidence of DVT on exam.  X-ray showed no acute findings.  Toradol significantly improve pain and hip strain leads the differential.  Toradol sent pharmacy in [place] [facility] advised.  All questions answered in layman's terms and return precautions discussed.    Amount and/or Complexity of Data Reviewed  Radiology: ordered. Decision-making details documented in ED Course.    Risk  Prescription drug management.                                      Clinical Impression:  Final diagnoses:  [M79.89] Leg swelling  [S76.012A] Strain of left hip, initial encounter (Primary)          ED Disposition Condition    Discharge Stable          ED Prescriptions       Medication Sig Dispense Start Date End Date Auth. Provider    ketorolac (TORADOL) 10 mg tablet Take 1 tablet (10 mg total) by mouth 3 (three) times daily. for 5 days 15 tablet 7/15/2024 7/20/2024 Leroy Solano MD          Follow-up Information       Follow up With Specialties Details Why Contact Info    [facility] - Emergency Dept Emergency Medicine  If symptoms worsen [address] [facility] [id number] [phone]    Meeks, Claude H., MD Family Medicine Schedule an appointment as soon as possible for a visit   [address] [place] [address] DR Yisel CONTRERAS [address]  [phone]               Leroy Solano MD  07/15/24 1442

## 2025-03-07 ENCOUNTER — TELEPHONE (OUTPATIENT)
Dept: SURGERY | Facility: CLINIC | Age: 55
End: 2025-03-07